# Patient Record
Sex: FEMALE | Race: WHITE | NOT HISPANIC OR LATINO | URBAN - METROPOLITAN AREA
[De-identification: names, ages, dates, MRNs, and addresses within clinical notes are randomized per-mention and may not be internally consistent; named-entity substitution may affect disease eponyms.]

---

## 2017-01-13 ENCOUNTER — COMMUNICATION - HEALTHEAST (OUTPATIENT)
Dept: FAMILY MEDICINE | Facility: CLINIC | Age: 52
End: 2017-01-13

## 2017-01-20 ENCOUNTER — OFFICE VISIT - HEALTHEAST (OUTPATIENT)
Dept: FAMILY MEDICINE | Facility: CLINIC | Age: 52
End: 2017-01-20

## 2017-01-20 DIAGNOSIS — I10 ESSENTIAL HYPERTENSION: ICD-10-CM

## 2017-01-20 RX ORDER — TOPIRAMATE 25 MG/1
TABLET, FILM COATED ORAL
Qty: 60 TABLET | Refills: 2 | Status: SHIPPED
Start: 2017-01-20

## 2017-01-20 RX ORDER — DIVALPROEX SODIUM 500 MG/1
500 TABLET, EXTENDED RELEASE ORAL
Status: SHIPPED | COMMUNITY
Start: 2017-01-12

## 2017-01-20 ASSESSMENT — MIFFLIN-ST. JEOR: SCORE: 1470.84

## 2019-01-04 ENCOUNTER — RECORDS - HEALTHEAST (OUTPATIENT)
Dept: ADMINISTRATIVE | Facility: OTHER | Age: 54
End: 2019-01-04

## 2021-01-22 ENCOUNTER — HOSPITAL ENCOUNTER (OUTPATIENT)
Dept: PHYSICAL MEDICINE AND REHAB | Facility: CLINIC | Age: 56
Discharge: HOME | End: 2021-01-22
Attending: NURSE PRACTITIONER

## 2021-01-22 DIAGNOSIS — R20.2 NUMBNESS AND TINGLING OF BOTH LOWER EXTREMITIES: ICD-10-CM

## 2021-01-22 DIAGNOSIS — Z98.890 HISTORY OF LUMBAR SURGERY: ICD-10-CM

## 2021-01-22 DIAGNOSIS — R20.0 NUMBNESS AND TINGLING OF BOTH LOWER EXTREMITIES: ICD-10-CM

## 2021-01-22 DIAGNOSIS — M54.50 ACUTE BILATERAL LOW BACK PAIN WITHOUT SCIATICA: ICD-10-CM

## 2021-01-22 DIAGNOSIS — M43.16 SPONDYLOLISTHESIS OF LUMBAR REGION: ICD-10-CM

## 2021-01-22 RX ORDER — METOPROLOL SUCCINATE 25 MG/1
25 TABLET, EXTENDED RELEASE ORAL DAILY
Status: SHIPPED | COMMUNITY
Start: 2020-12-28

## 2021-01-22 RX ORDER — TRAZODONE HYDROCHLORIDE 100 MG/1
TABLET ORAL
Status: SHIPPED | COMMUNITY
Start: 2021-01-18

## 2021-01-22 RX ORDER — CLONAZEPAM 0.5 MG/1
TABLET ORAL
Status: SHIPPED | COMMUNITY
Start: 2021-01-18

## 2021-01-22 RX ORDER — QUETIAPINE FUMARATE 100 MG/1
TABLET, FILM COATED ORAL
Status: SHIPPED | COMMUNITY
Start: 2021-01-18

## 2021-01-22 RX ORDER — TRIAMTERENE AND HYDROCHLOROTHIAZIDE 75; 50 MG/1; MG/1
TABLET ORAL
Status: SHIPPED | COMMUNITY
Start: 2020-01-02

## 2021-01-22 ASSESSMENT — MIFFLIN-ST. JEOR: SCORE: 1631.41

## 2021-01-23 ENCOUNTER — HOSPITAL ENCOUNTER (OUTPATIENT)
Dept: RADIOLOGY | Facility: CLINIC | Age: 56
Discharge: HOME | End: 2021-01-23

## 2021-01-23 DIAGNOSIS — M54.50 ACUTE BILATERAL LOW BACK PAIN WITHOUT SCIATICA: ICD-10-CM

## 2021-01-23 DIAGNOSIS — M43.16 SPONDYLOLISTHESIS OF LUMBAR REGION: ICD-10-CM

## 2021-01-23 DIAGNOSIS — R20.0 NUMBNESS AND TINGLING OF BOTH LOWER EXTREMITIES: ICD-10-CM

## 2021-01-23 DIAGNOSIS — Z98.890 HISTORY OF LUMBAR SURGERY: ICD-10-CM

## 2021-01-23 DIAGNOSIS — R20.2 NUMBNESS AND TINGLING OF BOTH LOWER EXTREMITIES: ICD-10-CM

## 2021-01-27 ENCOUNTER — COMMUNICATION - HEALTHEAST (OUTPATIENT)
Dept: PHYSICAL MEDICINE AND REHAB | Facility: CLINIC | Age: 56
End: 2021-01-27

## 2021-05-30 VITALS — HEIGHT: 68 IN | WEIGHT: 184.6 LBS | BODY MASS INDEX: 27.98 KG/M2

## 2021-06-05 VITALS — WEIGHT: 220 LBS | HEIGHT: 68 IN | BODY MASS INDEX: 33.34 KG/M2

## 2021-06-08 NOTE — PROGRESS NOTES
ASSESSMENT:  1. Essential hypertension     2. BMI 30.0-30.9,adult           PLAN:  Follow up in 1 month.  Continue supplements.  Given that she is having significant cravings and hunger after work will increase her topiramate to 59 g in the morning, 25 mg at night.  Encouraged her to keep breakfast regularly and have healthy snack mid afternoon to help with feeling ravenously hungry before dinnertime and overindulging.  Recommend increasing movement throughout the day--sitting less, moving more.  Will increase activity over time to reach a goal of at least 150 minutes of moderate exercise each week.  Continue work on weight loss for treatment of retention.  Behavior modification:  Cognitive restructuring exercises, journaling stressors, triggers for food cravings.  Dietary Intervention:  Reduced calorie, reduced carbohydrates, whole food diet.  Greater than 50% of this 15 minute visit was spent in counseling regarding patient's obesity, medications, dietary, exercise, and behavior modification recommendations.      SUBJECTIVE  Patient presents for treatment of chronic, comorbid conditions (hypertension) complicated by bipolar disorder and hypothyroidism using intensive therapeutic lifestyle interventions including nutrition, physical activity, and behavior management.  Unfortunately she is on some medications that can cause weight gain and she is fighting against this.  She does feel that she is ravenously hungry before dinner contents to snack a lot.  She does admit to just eating breakfast about half the time and is having a late lunch.  We discussed increasing her intake of food earlier in the day and to keep healthy snacks with her late afternoon so that she is not so hungry with a tendency to overindulge when she gets home.  We discussed increasing her topiramate slightly so that she is taking a slightly higher dose in the morning to see if this will help and she is okay with this plan.  She continues to work  on weight loss for treatment of hypertension.    Are you experiencing any side effects to the medications:  No  Hunger control:  good  Exercise was discussed: yes  Taking supplements:  yes  Discussed journaling food:  yes  Patient is pleased with the current results:  yes  The patient is following the nutrition plan:  yes  Barriers to losing weight:  Mental illness and meds for this    Patient Active Problem List   Diagnosis     Bipolar I disorder     Headache disorder     Mild cognitive disorder     Low kidney function     Degeneration of intervertebral disc of lumbosacral region     Gastroesophageal reflux disease     Hypertension     Impaired fasting glucose     Low back pain     Obesity, diabetes, and hypertension syndrome     Migraine     Decreased muscle tone     Adiposity     Hypothyroidism     BMI 30.0-30.9,adult       Current Outpatient Prescriptions on File Prior to Visit   Medication Sig Dispense Refill     acetaminophen (TYLENOL) 650 MG CR tablet Take 650 mg by mouth.       clobetasol (TEMOVATE) 0.05 % external solution        lamoTRIgine (LAMICTAL) 150 MG tablet        LATUDA 120 mg Tab        levothyroxine (SYNTHROID, LEVOTHROID) 100 MCG tablet        lithium (LITHOBID) 300 MG CR tablet Take by mouth.       lurasidone (LATUDA) 20 mg Tab tablet Take 1 tablet (20 mg total) by mouth daily. 30 tablet 0     OLANZapine (ZYPREXA) 15 MG tablet Take 1-3 tablets by mouth at bedtime as needed.       pantoprazole (PROTONIX) 40 MG tablet        ZOLMitriptan (ZOMIG-ZMT) 5 MG disintegrating tablet        [DISCONTINUED] topiramate (TOPAMAX) 25 MG tablet Take 1 tablet (25 mg total) by mouth 2 (two) times a day. 60 tablet 2     No current facility-administered medications on file prior to visit.            OBJECTIVE:  Vitals:    01/20/17 1112   BP: 132/78   Pulse: 76     Weight: 184 lb 9.6 oz (83.7 kg)  Body mass index is 28.07 kg/(m^2).  General:  Patient is alert, pleasant, no distress.  Patient is obese.        .  This note has been dictated using voice recognition software. Any grammatical or context distortions are unintentional and inherent to the software

## 2021-06-14 NOTE — PROGRESS NOTES
ASSESSMENT: Amira Hernández is a 55 y.o. female who presents for consultation at the request of PCP Dilcia Middleton PA-C, with a past medical history significant for hypertension, hypothyroidism, headache disorder, mild cognitive disorder, low kidney function, GERD, bipolar 1 disorder, obesity, L5-S1 disc prosthesis surgery Dr. James 7/8/2019 who presents today for new patient evaluation of:    -Acute midline low back pain lumbosacral junction with numbness and tingling bilateral posterior thighs onset 1/17/2021 with no known injury.    Patient is neurologically intact on exam. No myelopathic or red flag symptoms.     ES Score: 50    WHO 5: 3     Diagnoses and all orders for this visit:    Acute bilateral low back pain without sciatica  -     XR Lumbar Spine Flex and Ext 2 or 3 VWS; Future; Expected date: 01/22/2021  -     methylPREDNISolone (MEDROL, JONATHAN,) 4 mg tablet; 3 tablets daily for 3 days, then 2 tablets daily for 3 days, then 1 tablet daily for 3 days then stop  Dispense: 18 tablet; Refill: 0    Numbness and tingling of both lower extremities  -     XR Lumbar Spine Flex and Ext 2 or 3 VWS; Future; Expected date: 01/22/2021  -     methylPREDNISolone (MEDROL, JONATHAN,) 4 mg tablet; 3 tablets daily for 3 days, then 2 tablets daily for 3 days, then 1 tablet daily for 3 days then stop  Dispense: 18 tablet; Refill: 0    History of lumbar surgery  -     XR Lumbar Spine Flex and Ext 2 or 3 VWS; Future; Expected date: 01/22/2021    Spondylolisthesis of lumbar region  -     XR Lumbar Spine Flex and Ext 2 or 3 VWS; Future; Expected date: 01/22/2021      PLAN:  Reviewed spine anatomy and disease process. Discussed diagnosis and treatment options with the patient today. A shared decision making model was used.  The patient's values and choices were respected. The following represents what was discussed and decided upon by the provider and the patient.      -DIAGNOSTIC TESTS:  Images were personally reviewed and  interpreted and explained to patient today using spine model.   --Ordered lumbar spine flexion-extension x-ray to evaluate spondylolisthesis stability.  If there is concern could consider lumbar CT scan.  --Lumbar spine MRI 1/17/2021 with disc prosthesis L5-S1 with mild new anterolisthesis L5-S1.  Small disc protrusion L4-5, slight improvements since 2019, no annular tear identified at this time.  --Presurgical lumbar spine MRI [facility] 2019 did show advanced degenerative disc changes at L5-S1.  L4-5 mild degenerative disc changes with annular tear and disc bulging.    -PHYSICAL THERAPY: Could consider physical therapy if symptoms are starting to improve with Medrol Dosepak.  Discussed the importance of core strengthening, ROM, stretching exercises with the patient and how each of these entities is important in decreasing pain.  Explained to the patient that the purpose of physical therapy is to teach the patient a home exercise program.  These exercises need to be performed every day in order to decrease pain and prevent future occurrences of pain.        -MEDICATIONS: Prescribed low-dose Medrol Dosepak deceiving get further relief of her acute LBP since she is unable to take OTC NSAIDs due to kidney function.  -Advised patient continue with lidocaine patch as well as Flexeril and Tylenol as needed however this currently is providing minimal benefit.  Discussed multiple medication options today with patient. Discussed risks, side effects, and proper use of medications. Patient verbalized understanding.    -INTERVENTIONS: Could consider injections pending x-ray review bilateral L5-S1 TFESI.  Discussed risks and benefits of injections with patient today.    -PATIENT EDUCATION:  45 minutes of total visit time was spent face to face with the patient today, greater than 50% of total time spent with patient was spent on counseling, education, and coordinating care.   -10 minutes spent outside of visit time, non-face-to-face  time, reviewing chart.  -Today we also discussed the issues related to the current COVID-19 pandemic, the pros and cons of the current treatment plan, the CDC guidelines such as social distancing, washing the hands, and covering the cough.  -The patient was masked and proper PPE was worn by the provider for the duration of this visit including a surgical mask, gloves, and protective eyewear.    -FOLLOW-UP:   Follow-up after obtaining x-ray to review results and ongoing plan.    Advised patient to call the [facility] if symptoms worsen or you have problems controlling bladder and bowel function.   ______________________________________________________________________    SUBJECTIVE:  HPI:  Amira Hernández  Is a 55 y.o. female who presents today for new patient evaluation of low back pain midline lumbosacral junction that occurred 1/17/2021 when patient bent down at home to  a pillow.  No other significant injury.  She reports that since then her pain has been fairly severe constantly at an 8/10, and at its worst most bothersome with any type of bending or twisting of the lumbar spine.  Patient denies any radiating pain into her lower extremities but she is having numbness and tingling to the posterior buttock posterior thighs as well as some numbness and tingling into the vaginal area.    Patient reports that post disc placement she had been doing quite well and had almost complete resolution of her back pain up until recent event.  She does report however she has episodes of constipation and then does take MiraLAX on a regular basis, she reports that on occasion she has had a bowel loss of control with loose bowels after taking too much MiraLAX.  Otherwise denies any recent loss of bowel or bladder control.  She does report that on occasions when she allows her bladder to be to fall she is also had episodes of loss of control.  Otherwise denies any urinary retention issues.  Denies saddle  anesthesia.    -Treatment to Date: History of lumbar disc prosthesis L5-S1.    -Medications:  Tylenol 650 mg  Lidocaine patch  Flexeril 10 mg    Current Outpatient Medications on File Prior to Encounter   Medication Sig Dispense Refill     acetaminophen (TYLENOL) 650 MG CR tablet Take 650 mg by mouth.       clobetasol (TEMOVATE) 0.05 % external solution        cyclobenzaprine (FLEXERIL) 10 MG tablet Take 1 tablet (10 mg total) by mouth 3 (three) times a day as needed for muscle spasms. 20 tablet 0     divalproex (DEPAKOTE ER) 500 MG 24 hr tablet Take 500 mg by mouth.       lamoTRIgine (LAMICTAL) 150 MG tablet        LATUDA 120 mg Tab        levothyroxine (SYNTHROID, LEVOTHROID) 100 MCG tablet        lidocaine (LIDODERM) 5 % Remove & Discard patch within 12 hours or as directed by MD 15 patch 0     lurasidone (LATUDA) 20 mg Tab tablet Take 1 tablet (20 mg total) by mouth daily. 30 tablet 0     OLANZapine (ZYPREXA) 15 MG tablet Take 1-3 tablets by mouth at bedtime as needed.       pantoprazole (PROTONIX) 40 MG tablet        QUEtiapine (SEROQUEL) 100 MG tablet take one to two tablets daily if needed; this is in addition to your scheduled 600mg  daily.       topiramate (TOPAMAX) 25 MG tablet Take 50 mg in am and 25 mg in pm 60 tablet 2     traZODone (DESYREL) 100 MG tablet Take 1-2 tablets by mouth at bedtime as needed for sleepTake 1-2 tablets by mouth at bedtime as needed for sleep       triamterene-hydrochlorothiazide (MAXZIDE) 75-50 mg per tablet triamterene 75 mg-hydrochlorothiazide 50 mg tablet       ZOLMitriptan (ZOMIG-ZMT) 5 MG disintegrating tablet        clonazePAM (KLONOPIN) 0.5 MG tablet        lithium (LITHOBID) 300 MG CR tablet Take by mouth.       metoprolol succinate (TOPROL-XL) 25 MG Take 25 mg by mouth daily.       No current facility-administered medications on file prior to encounter.        Allergies   Allergen Reactions     Codeine      Dilaudid [Hydromorphone] Itching     After 3 doses, not  "noticed with first 2 doses     Oxcarbazepine Unknown     Decline in sodium levels     Phentermine Other (See Comments)     Manic symptoms       No past medical history on file.     Patient Active Problem List   Diagnosis     Bipolar I disorder (H)     Headache disorder     Mild cognitive disorder     Low kidney function     Degeneration of intervertebral disc of lumbosacral region     Gastroesophageal reflux disease     Hypertension     Impaired fasting glucose     Low back pain     Obesity, diabetes, and hypertension syndrome (H)     Migraine     Decreased muscle tone     Adiposity     Hypothyroidism     BMI 30.0-30.9,adult       No past surgical history on file.    No family history on file.    Reviewed past medical, surgical, and family history with patient found on new patient intake packet located in [employer] tab.     SOCIAL HX: She is  and works as a registered nurse.  Patient denies walking/tobacco use.  Denies alcohol use, denies history being heavy drinker.  Denies recreational drug use.    ROS: Positive for muscle pain, insomnia, excessive tiredness, depression, constipation, reflux.  Specifically negative for bowel/bladder dysfunction, balance changes, headache, dizziness, foot drop, fevers, chills, appetite changes, nausea/vomiting, unexplained weight loss. Otherwise 13 systems reviewed are negative. Please see the patient's intake questionnaire from today for details.    OBJECTIVE:  /82 (Patient Site: Right Arm, Patient Position: Sitting, Cuff Size: Adult Regular)   Ht 5' 8\" (1.727 m)   Wt 220 lb (99.8 kg)   BMI 33.45 kg/m      PHYSICAL EXAMINATION:    --CONSTITUTIONAL:  Vital signs as above.  No acute distress.  The patient is well nourished and well groomed.  --PSYCHIATRIC:  Appropriate mood and affect. The patient is awake, alert, oriented to person, place, time and answering questions appropriately with clear speech.    --SKIN:  Skin over the face, bilateral lower extremities, and " posterior torso is clean, dry, intact without rashes.    --RESPIRATORY: Normal rhythm and effort. No abnormal accessory muscle breathing patterns noted.   --ABDOMINAL:  Non-distended.  --STANDING EXAMINATION:  Normal lumbar lordosis noted, no lateral shift.  --MUSCULOSKELETAL: Lumbar spine inspection reveals no evidence of deformity. Range of motion is limited in l all movements: Umbar flexion, extension, lateral rotation.  Tenderness to palpation lumbosacral junction right greater than left. Straight leg raising in the seated position is negative to radicular pain, positive to back pain bilaterally. Sciatic notch non-tender.  --SACROILIAC JOINT: One Finger point test negative.  --GROSS MOTOR: Gait is non-antalgic. Easily arises from a seated position. Toe walking and heel walking are normal without significant difficulty.    --LOWER EXTREMITY MOTOR TESTING:  Plantar flexion left 5/5, right 5/5   Dorsiflexion left 5/5, right 5/5   Great toe MTP extension left 5/5, right 5/5  Knee flexion left 5/5, right 5/5  Knee extension left 5/5, right 5/5   Hip flexion left 5/5, right 5/5  Hip abduction left 5/5, right 5/5  Hip adduction left 5/5, right 5/5   --HIPS: Full range of motion bilaterally. Negative FABERs on the involved lower extremity.   --NEUROLOGICAL:  2/4 patellar, medial hamstring, and achilles reflexes bilaterally.  Sensation to light touch is intact in the bilateral L4, L5, and S1 dermatomes. Babinski is negative. No clonus.  Negative Bharath reflex bilaterally.  --VASCULAR:  2/4 dorsalis pedis and posterior tibialsi pulses bilaterally.  Bilateral lower extremities are warm.  There is no pitting edema of the bilateral lower extremities.    RESULTS: Prior medical records from [facility] 1/20/2017 to current and care everywhere were reviewed today.    Imaging: Lumbar spine Imaging was personally reviewed and interpreted today. The images were shown to the patient and the findings were explained using a  spine model.      Mr Lumbar Spine Without Contrast  Result Date: 1/17/2021  [facility] MR LUMBAR SPINE WO CONTRAST 1/17/2021 6:50 PM INDICATION: Back pain or radiculopathy, prior surgery, new symptoms midline TTP; radiation to right left leg; prior disc replacement. TECHNIQUE: Routine. CONTRAST: None. COMPARISON: Lumbar MRI from 08/22/2018 and lumbar x-ray from 08/28/2019. FINDINGS: 5 lumbar-type vertebral bodies. Disc prosthesis at L5-S1 again noted. Significant susceptibility artifact is noted from the implant. On the sagittal views there appears to be mild new anterolisthesis of L5 on S1, however, this is not clearly corroborated on the axial views and may be artifactual. Alignment is otherwise normal. No marrow edema. No compression fracture. Normal distal spinal cord and cauda equina with conus medullaris at L1. Scattered renal cysts again noted. They are likely incidental. Unremarkable visualized bony pelvis.   T12-L1: Normal disc height and signal. No herniation. No facet arthropathy. No spinal canal stenosis. No right neural foraminal stenosis. No left neural foraminal stenosis.   L1-L2: Normal disc height and signal. No herniation. No facet arthropathy. No spinal canal stenosis. No right neural foraminal stenosis. No left neural foraminal stenosis.   L2-L3: Normal disc height and signal. No herniation. No facet arthropathy. No spinal canal stenosis. No right neural foraminal stenosis. No left neural foraminal stenosis.   L3-L4: Normal disc height and signal. No herniation. No facet arthropathy. No spinal canal stenosis. No right neural foraminal stenosis. No left neural foraminal stenosis.   L4-L5: Mild loss of disc space height and signal. Minimal disc bulging without focal herniation. Minimal disc bulging. Tiny midline protrusion. Findings are similar to 2018. No facet arthropathy. No spinal canal stenosis. No right neural foraminal stenosis. No left neural foraminal stenosis.    L5-S1: Disc prosthesis. No canal or foraminal narrowing.   CONCLUSION:   1.  Disc prosthesis L5-S1 with significant associated susceptibility artifact. Apparent new anterolisthesis L5 on S1 on the sagittal sequences is not corroborated on the axial sequences and may be artifactual, however consider CT for further evaluation to evaluate the hardware.   2.  Mild degenerative disc disease and tiny disc protrusion at L4-L5 are stable. No canal or foraminal narrowing.

## 2021-06-14 NOTE — TELEPHONE ENCOUNTER
"Phone call to patient in follow up to check on status with Medrol Dose pack.    Patient apologized for missing appointment; she had it written down for tomorrow AM. She reports she is getting better. 90% better at this time. \"Getting around better and able to actually lift my feet up. I am almost done with the steroid. I ice the back when I get home in the evening.\" She declines a follow up visit at this time.  "

## 2021-06-14 NOTE — PATIENT INSTRUCTIONS - HE
~Please call our [facility] Nurse Navigation line [phone] with any questions or concerns about your treatment plan, if symptoms worsen and you would like to be seen urgently, or if you have problems controlling bladder and bowel function.      A Medrol Dose Pack (Prednisone Taper) was prescribed today for your acute pain. Please follow the dosing instruction on prescription bottle.     POSSIBLE STEROID SIDE EFFECTS   -If you experience these, it should only last for a short period-   Change in menstrual flow   Swelling   Increased appetite   Skin redness (flushness)   Skin rash   Mouth (oral) irritation   Blood sugar (glucose) levels   Sweats   Mood changes      Lumbar spine flexion-extension x-ray imaging has been ordered. Radiology will call you to schedule. Please call below if you do not hear from them in the next couple of days.     [facility] Scheduling    Please call [phone] to schedule your image(s) (select option #1). There are 3 different locations, see below.     [facility]  [address] [address]

## 2021-06-21 NOTE — LETTER
Letter by Amira Sharma CNP at      Author: Amira Sharma CNP Service: -- Author Type: --    Filed:  Date of Service:  Status: (Other)         January 22, 2021     Patient: Amira Hernández   YOB: 1965   Date of Visit: 1/22/2021       To Whom it May Concern:    Amira Hernández was seen in my clinic on 1/22/2021. She may return to work on 1/22/2021.    If you have any questions or concerns, please don't hesitate to call.    Sincerely,         Amira Sharma CNP

## 2021-06-26 ENCOUNTER — HEALTH MAINTENANCE LETTER (OUTPATIENT)
Age: 56
End: 2021-06-26

## 2021-10-16 ENCOUNTER — HEALTH MAINTENANCE LETTER (OUTPATIENT)
Age: 56
End: 2021-10-16

## 2022-02-05 ENCOUNTER — HEALTH MAINTENANCE LETTER (OUTPATIENT)
Age: 57
End: 2022-02-05

## 2022-05-28 ENCOUNTER — HEALTH MAINTENANCE LETTER (OUTPATIENT)
Age: 57
End: 2022-05-28

## 2022-07-23 ENCOUNTER — HEALTH MAINTENANCE LETTER (OUTPATIENT)
Age: 57
End: 2022-07-23

## 2022-09-25 ENCOUNTER — HEALTH MAINTENANCE LETTER (OUTPATIENT)
Age: 57
End: 2022-09-25

## 2023-02-04 ENCOUNTER — HEALTH MAINTENANCE LETTER (OUTPATIENT)
Age: 58
End: 2023-02-04

## 2023-05-13 ENCOUNTER — HEALTH MAINTENANCE LETTER (OUTPATIENT)
Age: 58
End: 2023-05-13

## 2023-08-06 ENCOUNTER — HEALTH MAINTENANCE LETTER (OUTPATIENT)
Age: 58
End: 2023-08-06

## 2023-10-15 ENCOUNTER — HEALTH MAINTENANCE LETTER (OUTPATIENT)
Age: 58
End: 2023-10-15

## 2024-03-03 ENCOUNTER — HEALTH MAINTENANCE LETTER (OUTPATIENT)
Age: 59
End: 2024-03-03